# Patient Record
Sex: MALE | Race: WHITE | Employment: FULL TIME | URBAN - METROPOLITAN AREA
[De-identification: names, ages, dates, MRNs, and addresses within clinical notes are randomized per-mention and may not be internally consistent; named-entity substitution may affect disease eponyms.]

---

## 2023-02-17 ENCOUNTER — HOSPITAL ENCOUNTER (OUTPATIENT)
Age: 23
Discharge: HOME | End: 2023-02-17

## 2023-02-17 VITALS
TEMPERATURE: 99 F | SYSTOLIC BLOOD PRESSURE: 139 MMHG | HEART RATE: 98 BPM | DIASTOLIC BLOOD PRESSURE: 99 MMHG | OXYGEN SATURATION: 100 % | RESPIRATION RATE: 18 BRPM

## 2023-02-17 DIAGNOSIS — K92.1 BLOOD IN STOOL: Primary | ICD-10-CM

## 2023-02-17 LAB
#MXD IC: 0.4 X10ˆ3/UL (ref 0.1–1)
HCT VFR BLD AUTO: 33.7 %
HGB BLD-MCNC: 11.7 G/DL
LYMPHOCYTES # BLD AUTO: 1.4 X10ˆ3/UL (ref 1–4)
LYMPHOCYTES NFR BLD AUTO: 25.8 %
MCH RBC QN AUTO: 30.6 PG (ref 26–34)
MCHC RBC AUTO-ENTMCNC: 34.7 G/DL (ref 31–37)
MCV RBC AUTO: 88.2 FL (ref 80–100)
MIXED CELL %: 7.4 %
NEUTROPHILS # BLD AUTO: 3.6 X10ˆ3/UL (ref 1.5–7.7)
NEUTROPHILS NFR BLD AUTO: 66.8 %
PLATELET # BLD AUTO: 311 X10ˆ3/UL (ref 150–450)
RBC # BLD AUTO: 3.82 X10ˆ6/UL
WBC # BLD AUTO: 5.4 X10ˆ3/UL (ref 4–11)

## 2023-02-17 PROCEDURE — 99203 OFFICE O/P NEW LOW 30 MIN: CPT

## 2023-02-17 PROCEDURE — 85025 COMPLETE CBC W/AUTO DIFF WBC: CPT

## 2023-02-17 PROCEDURE — 36415 COLL VENOUS BLD VENIPUNCTURE: CPT

## 2023-02-17 NOTE — ED INITIAL ASSESSMENT (HPI)
PATIENT ARRIVED AMBULATORY TO ROOM C/O SYMPTOMS THAT STARTED 4 DAYS AGO. PATIENT STATES HE HAS BEEN NOTICING BLOOD IN HIS STOOLS INTERMITTENTLY X4 DAYS. PATIENT STATES THE STOOL APPEARS DARK BUT THE WATER IN THE TOILET LOOKS \"RED\" NO ABDOMINAL PAIN. NO FEVERS.

## 2023-02-18 NOTE — DISCHARGE INSTRUCTIONS
You have blood in your stool. Your hemoglobin today was 11.7. Please make an appointment to follow-up with a GI specialist as discussed as you need further evaluation. If you develop any abdominal pain, dizziness, weakness, shortness of breath, chest pain, passing out or any concerning symptoms you should go to the emergency department. Otherwise follow-up with your primary care provider.   You should eat a bland diet and avoid alcohol until you follow-up with GI.